# Patient Record
Sex: FEMALE | Race: WHITE | NOT HISPANIC OR LATINO | URBAN - METROPOLITAN AREA
[De-identification: names, ages, dates, MRNs, and addresses within clinical notes are randomized per-mention and may not be internally consistent; named-entity substitution may affect disease eponyms.]

---

## 2019-09-10 ENCOUNTER — APPOINTMENT (RX ONLY)
Dept: URBAN - METROPOLITAN AREA CLINIC 149 | Facility: CLINIC | Age: 69
Setting detail: DERMATOLOGY
End: 2019-09-10

## 2019-09-10 DIAGNOSIS — L82.1 OTHER SEBORRHEIC KERATOSIS: ICD-10-CM

## 2019-09-10 DIAGNOSIS — L81.4 OTHER MELANIN HYPERPIGMENTATION: ICD-10-CM

## 2019-09-10 PROBLEM — E13.9 OTHER SPECIFIED DIABETES MELLITUS WITHOUT COMPLICATIONS: Status: ACTIVE | Noted: 2019-09-10

## 2019-09-10 PROCEDURE — ? COUNSELING

## 2019-09-10 PROCEDURE — ? INVENTORY

## 2019-09-10 PROCEDURE — 99203 OFFICE O/P NEW LOW 30 MIN: CPT

## 2019-09-10 ASSESSMENT — LOCATION DETAILED DESCRIPTION DERM
LOCATION DETAILED: RIGHT PROXIMAL DORSAL FOREARM
LOCATION DETAILED: LEFT SUPERIOR CENTRAL MALAR CHEEK
LOCATION DETAILED: RIGHT INFERIOR UPPER BACK
LOCATION DETAILED: LEFT PROXIMAL DORSAL FOREARM
LOCATION DETAILED: LEFT INFERIOR UPPER BACK

## 2019-09-10 ASSESSMENT — LOCATION SIMPLE DESCRIPTION DERM
LOCATION SIMPLE: LEFT FOREARM
LOCATION SIMPLE: LEFT CHEEK
LOCATION SIMPLE: RIGHT FOREARM
LOCATION SIMPLE: RIGHT UPPER BACK
LOCATION SIMPLE: LEFT UPPER BACK

## 2019-09-10 ASSESSMENT — LOCATION ZONE DERM
LOCATION ZONE: ARM
LOCATION ZONE: TRUNK
LOCATION ZONE: FACE

## 2021-08-06 ENCOUNTER — OFFICE VISIT (OUTPATIENT)
Dept: URBAN - METROPOLITAN AREA CLINIC 18 | Facility: CLINIC | Age: 71
End: 2021-08-06
Payer: MEDICARE

## 2021-08-06 DIAGNOSIS — E11.3293 TYPE 2 DIAB W MILD NONPRLF DIABETIC RTNOP W/O MACULAR EDEMA, BILATERAL: Primary | ICD-10-CM

## 2021-08-06 DIAGNOSIS — H43.813 VITREOUS DEGENERATION, BILATERAL: ICD-10-CM

## 2021-08-06 DIAGNOSIS — Z96.1 PRESENCE OF INTRAOCULAR LENS: ICD-10-CM

## 2021-08-06 PROCEDURE — 99204 OFFICE O/P NEW MOD 45 MIN: CPT | Performed by: OPTOMETRIST

## 2021-08-06 ASSESSMENT — INTRAOCULAR PRESSURE
OD: 15
OS: 15

## 2021-08-06 NOTE — IMPRESSION/PLAN
Impression: Type 2 diab w mild nonprlf diabetic rtnop w/o macular edema, bilateral: N42.2995. Plan: Diabetes type II: mild background diabetic retinopathy, no signs of neovascularization noted. No treatment necessary at this time. Patient was instructed to monitor vision for sudden changes and to call if visual changes noted. Discussed ocular and systemic benefits of blood sugar control.

## 2022-07-30 ENCOUNTER — TELEPHONE ENCOUNTER (OUTPATIENT)
Age: 72
End: 2022-07-30

## 2022-07-30 RX ORDER — SODIUM SULFATE, POTASSIUM SULFATE, MAGNESIUM SULFATE 17.5; 3.13; 1.6 G/ML; G/ML; G/ML
1 SOLUTION, CONCENTRATE ORAL AS DIRECTED
Qty: 0 | Refills: 2 | OUTPATIENT
Start: 2015-07-06 | End: 2015-08-12

## 2022-07-31 ENCOUNTER — TELEPHONE ENCOUNTER (OUTPATIENT)
Age: 72
End: 2022-07-31

## 2022-07-31 RX ORDER — SODIUM SULFATE, POTASSIUM SULFATE, MAGNESIUM SULFATE 17.5; 3.13; 1.6 G/ML; G/ML; G/ML
1 SOLUTION, CONCENTRATE ORAL AS DIRECTED
Qty: 0 | Refills: 2 | Status: ACTIVE | COMMUNITY
Start: 2015-07-06

## 2022-11-22 ENCOUNTER — OFFICE VISIT (OUTPATIENT)
Dept: URBAN - METROPOLITAN AREA CLINIC 18 | Facility: CLINIC | Age: 72
End: 2022-11-22
Payer: COMMERCIAL

## 2022-11-22 DIAGNOSIS — Z96.1 PRESENCE OF INTRAOCULAR LENS: ICD-10-CM

## 2022-11-22 DIAGNOSIS — E11.3293 TYPE 2 DIAB W MILD NONPRLF DIABETIC RTNOP W/O MACULAR EDEMA, BILATERAL: Primary | ICD-10-CM

## 2022-11-22 DIAGNOSIS — H43.813 VITREOUS DEGENERATION, BILATERAL: ICD-10-CM

## 2022-11-22 PROCEDURE — 99213 OFFICE O/P EST LOW 20 MIN: CPT | Performed by: OPTOMETRIST

## 2022-11-22 ASSESSMENT — INTRAOCULAR PRESSURE
OS: 18
OD: 15

## 2022-11-22 NOTE — IMPRESSION/PLAN
Impression: Type 2 diab w mild nonprlf diabetic rtnop w/o macular edema, bilateral: [id number]. Plan: Diabetes type II: mild background diabetic retinopathy, no signs of neovascularization noted. No treatment necessary at this time. Patient was instructed to monitor vision for sudden changes and to call if visual changes noted. Discussed ocular and systemic benefits of blood sugar control.

## 2024-01-31 ENCOUNTER — OFFICE VISIT (OUTPATIENT)
Dept: URBAN - METROPOLITAN AREA CLINIC 18 | Facility: CLINIC | Age: 74
End: 2024-01-31
Payer: MEDICARE

## 2024-01-31 DIAGNOSIS — H43.813 VITREOUS DEGENERATION, BILATERAL: ICD-10-CM

## 2024-01-31 DIAGNOSIS — E11.3293 TYPE 2 DIAB W MILD NONPRLF DIABETIC RTNOP W/O MACULAR EDEMA, BILATERAL: Primary | ICD-10-CM

## 2024-01-31 DIAGNOSIS — Z96.1 PRESENCE OF INTRAOCULAR LENS: ICD-10-CM

## 2024-01-31 DIAGNOSIS — H04.123 DRY EYE SYNDROME OF BILATERAL LACRIMAL GLANDS: ICD-10-CM

## 2024-01-31 DIAGNOSIS — H26.493 OTHER SECONDARY CATARACT, BILATERAL: ICD-10-CM

## 2024-01-31 PROCEDURE — 92134 CPTRZ OPH DX IMG PST SGM RTA: CPT

## 2024-01-31 PROCEDURE — 99214 OFFICE O/P EST MOD 30 MIN: CPT

## 2024-01-31 ASSESSMENT — INTRAOCULAR PRESSURE
OD: 17
OS: 17

## 2025-02-25 ENCOUNTER — OFFICE VISIT (OUTPATIENT)
Dept: URBAN - METROPOLITAN AREA CLINIC 18 | Facility: CLINIC | Age: 75
End: 2025-02-25
Payer: MEDICARE

## 2025-02-25 DIAGNOSIS — E11.3293 TYPE 2 DIABETES MELLITUS WITH MILD NONPROLIFERATIVE DIABETIC RETINOPATHY WITHOUT MACULAR EDEMA, BILATERAL: Primary | ICD-10-CM

## 2025-02-25 DIAGNOSIS — H04.123 DRY EYE SYNDROME OF BILATERAL LACRIMAL GLANDS: ICD-10-CM

## 2025-02-25 PROCEDURE — 99213 OFFICE O/P EST LOW 20 MIN: CPT

## 2025-02-25 PROCEDURE — 92134 CPTRZ OPH DX IMG PST SGM RTA: CPT

## 2025-02-25 ASSESSMENT — INTRAOCULAR PRESSURE
OD: 15
OS: 16

## 2025-02-27 RX ORDER — PREDNISOLONE ACETATE 10 MG/ML
1 % SUSPENSION/ DROPS OPHTHALMIC
Qty: 5 | Refills: 0 | Status: ACTIVE
Start: 2025-02-27